# Patient Record
Sex: FEMALE | Race: WHITE | URBAN - METROPOLITAN AREA
[De-identification: names, ages, dates, MRNs, and addresses within clinical notes are randomized per-mention and may not be internally consistent; named-entity substitution may affect disease eponyms.]

---

## 2020-07-31 ENCOUNTER — OFFICE VISIT (OUTPATIENT)
Dept: NEUROLOGY | Age: 55
End: 2020-07-31
Payer: MEDICARE

## 2020-07-31 VITALS — DIASTOLIC BLOOD PRESSURE: 74 MMHG | SYSTOLIC BLOOD PRESSURE: 120 MMHG

## 2020-07-31 PROBLEM — F90.0 ATTENTION DEFICIT HYPERACTIVITY DISORDER (ADHD), PREDOMINANTLY INATTENTIVE TYPE: Status: ACTIVE | Noted: 2020-07-31

## 2020-07-31 PROBLEM — G93.1 HYPOXIC ENCEPHALOPATHY (HCC): Status: ACTIVE | Noted: 2020-07-31

## 2020-07-31 PROBLEM — G47.31 PRIMARY CENTRAL SLEEP APNEA: Status: ACTIVE | Noted: 2020-07-31

## 2020-07-31 PROBLEM — G47.419 PRIMARY NARCOLEPSY WITHOUT CATAPLEXY: Status: ACTIVE | Noted: 2020-07-31

## 2020-07-31 PROBLEM — R41.3 MEMORY LOSS: Status: ACTIVE | Noted: 2020-07-31

## 2020-07-31 PROCEDURE — 3017F COLORECTAL CA SCREEN DOC REV: CPT | Performed by: PSYCHIATRY & NEUROLOGY

## 2020-07-31 PROCEDURE — G8421 BMI NOT CALCULATED: HCPCS | Performed by: PSYCHIATRY & NEUROLOGY

## 2020-07-31 PROCEDURE — 99215 OFFICE O/P EST HI 40 MIN: CPT | Performed by: PSYCHIATRY & NEUROLOGY

## 2020-07-31 PROCEDURE — 4004F PT TOBACCO SCREEN RCVD TLK: CPT | Performed by: PSYCHIATRY & NEUROLOGY

## 2020-07-31 PROCEDURE — G8427 DOCREV CUR MEDS BY ELIG CLIN: HCPCS | Performed by: PSYCHIATRY & NEUROLOGY

## 2020-07-31 RX ORDER — GABAPENTIN 600 MG/1
600 TABLET ORAL NIGHTLY
COMMUNITY
Start: 2020-04-07 | End: 2022-02-18

## 2020-07-31 RX ORDER — GLIPIZIDE 10 MG/1
10 TABLET, FILM COATED, EXTENDED RELEASE ORAL
COMMUNITY
Start: 2020-04-06

## 2020-07-31 RX ORDER — PRAVASTATIN SODIUM 20 MG
20 TABLET ORAL NIGHTLY
COMMUNITY
Start: 2020-05-08

## 2020-07-31 RX ORDER — HYDROCHLOROTHIAZIDE 25 MG/1
25 TABLET ORAL DAILY
COMMUNITY
Start: 2020-04-06

## 2020-07-31 RX ORDER — LOSARTAN POTASSIUM 100 MG/1
100 TABLET ORAL DAILY
COMMUNITY
Start: 2020-04-06

## 2020-07-31 RX ORDER — INSULIN ASPART 100 [IU]/ML
INJECTION, SOLUTION INTRAVENOUS; SUBCUTANEOUS
COMMUNITY
Start: 2020-05-08 | End: 2022-08-19

## 2020-07-31 RX ORDER — MODAFINIL 200 MG/1
200 TABLET ORAL 2 TIMES DAILY
COMMUNITY
Start: 2019-12-13 | End: 2021-08-20 | Stop reason: SDUPTHER

## 2020-07-31 RX ORDER — INSULIN GLARGINE 100 [IU]/ML
INJECTION, SOLUTION SUBCUTANEOUS
COMMUNITY
Start: 2020-04-06 | End: 2022-08-19

## 2020-07-31 RX ORDER — LANCETS 30 GAUGE
EACH MISCELLANEOUS
COMMUNITY
Start: 2020-04-06

## 2020-07-31 RX ORDER — BLOOD SUGAR DIAGNOSTIC
STRIP MISCELLANEOUS
COMMUNITY
Start: 2020-04-06

## 2020-07-31 RX ORDER — ATENOLOL 25 MG/1
25 TABLET ORAL 2 TIMES DAILY
COMMUNITY
Start: 2020-04-06

## 2020-07-31 RX ORDER — HYDROXYCHLOROQUINE SULFATE 200 MG/1
200 TABLET, FILM COATED ORAL
COMMUNITY
Start: 2019-01-23

## 2020-07-31 ASSESSMENT — ENCOUNTER SYMPTOMS
COLOR CHANGE: 0
VOMITING: 0
NAUSEA: 0
TROUBLE SWALLOWING: 0
SHORTNESS OF BREATH: 0
PHOTOPHOBIA: 0
CHOKING: 0
BACK PAIN: 0

## 2020-07-31 NOTE — PROGRESS NOTES
on file   Substance and Sexual Activity    Alcohol use: Not on file    Drug use: Not on file    Sexual activity: Not on file   Lifestyle    Physical activity     Days per week: Not on file     Minutes per session: Not on file    Stress: Not on file   Relationships    Social connections     Talks on phone: Not on file     Gets together: Not on file     Attends Buddhist service: Not on file     Active member of club or organization: Not on file     Attends meetings of clubs or organizations: Not on file     Relationship status: Not on file    Intimate partner violence     Fear of current or ex partner: Not on file     Emotionally abused: Not on file     Physically abused: Not on file     Forced sexual activity: Not on file   Other Topics Concern    Not on file   Social History Narrative    Not on file     No family history on file. Allergies   Allergen Reactions    Interferon Jerome Anaphylaxis    Sulfamethoxazole-Trimethoprim Swelling    Tizanidine Hcl Other (See Comments)       Current Outpatient Medications   Medication Sig Dispense Refill    Glucose Blood (BL TEST STRIP PACK VI) Use as instructed - check sugar 4 times daily E11.9 insulin use      Calcium Carb-Cholecalciferol (CALCIUM CARBONATE-VITAMIN D3 PO) Take by mouth      CPAP Machine MISC Please change pressure settings to 5-20 cmH20      gabapentin (NEURONTIN) 600 MG tablet Take 600 mg by mouth nightly.  Lancets MISC USE TO TEST SUGAR FOUR TIMES DAILY E11.9      Insulin Syringe-Needle U-100 (B-D INS SYR ULTRAFINE .3CC/31G) 31G X 5/16\" 0.3 ML MISC USE WITH INSULIN PENs 4 a day.   Dx; E11.9  insulin use      atenolol (TENORMIN) 25 MG tablet Take 25 mg by mouth 2 times daily      diclofenac sodium (VOLTAREN) 1 % GEL Apply 2 g topically 4 times daily      glipiZIDE (GLUCOTROL XL) 10 MG extended release tablet Take 10 mg by mouth every morning (before breakfast)      hydroCHLOROthiazide (HYDRODIURIL) 25 MG tablet Take 25 mg by mouth daily      hydroxychloroquine (PLAQUENIL) 200 MG tablet 200 mg      insulin aspart (NOVOLOG FLEXPEN) 100 UNIT/ML injection pen Inject subcutaneously 4 units before breakfast, 8 units before lunch, 10 units before dinner PLUS SS (~30 units daily)      insulin glargine (LANTUS SOLOSTAR) 100 UNIT/ML injection pen ADMINISTER 34 UNITS UNDER THE SKIN EVERY DAY IN THE MORNING      losartan (COZAAR) 100 MG tablet Take 100 mg by mouth daily      modafinil (PROVIGIL) 200 MG tablet Take 200 mg by mouth 2 times daily.  pravastatin (PRAVACHOL) 20 MG tablet Take 20 mg by mouth nightly       No current facility-administered medications for this visit. Review of Systems   Constitutional: Negative for fever. HENT: Negative for ear pain, tinnitus and trouble swallowing. Eyes: Negative for photophobia and visual disturbance. Respiratory: Negative for choking and shortness of breath. Cardiovascular: Negative for chest pain and palpitations. Gastrointestinal: Negative for nausea and vomiting. Musculoskeletal: Negative for back pain, gait problem, joint swelling, myalgias, neck pain and neck stiffness. Skin: Negative for color change. Allergic/Immunologic: Negative for food allergies. Neurological: Negative for dizziness, tremors, seizures, syncope, facial asymmetry, speech difficulty, weakness, light-headedness, numbness and headaches. Psychiatric/Behavioral: Negative for behavioral problems, confusion, hallucinations and sleep disturbance. Objective:   /74     Physical Exam  Vitals signs reviewed. Eyes:      Pupils: Pupils are equal, round, and reactive to light. Neck:      Musculoskeletal: Normal range of motion. Cardiovascular:      Rate and Rhythm: Normal rate and regular rhythm. Heart sounds: No murmur. Pulmonary:      Effort: Pulmonary effort is normal.      Breath sounds: Normal breath sounds.    Abdominal:      General: Bowel sounds are normal.   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 For now we recommended that she continue on Provigil 100 mg twice a day in the future we will bring her back in 4 months for reevaluation of ADD as she may benefit from other stimulants. Patient's hypoxic injury occurred secondary to cardiac arrest from a medication reaction. The records were not obtainable as these are very old records. My clinical standpoint given this clinical diagnosis patient is not able to work. Nicholas Massey MD, Sienna Villagomez, American Board of Psychiatry & Neurology  Board Certified in Vascular Neurology  Board Certified in Neuromuscular Medicine  Certified in [facility]:      No orders of the defined types were placed in this encounter. No orders of the defined types were placed in this encounter. Return in about 4 months (around 11/30/2020).       Riley Fisher MD

## 2020-10-23 RX ORDER — MODAFINIL 200 MG/1
200 TABLET ORAL 2 TIMES DAILY
Qty: 60 TABLET | Refills: 1 | Status: CANCELLED | OUTPATIENT
Start: 2020-10-23 | End: 2020-11-22

## 2020-10-23 NOTE — TELEPHONE ENCOUNTER
Patients  came to the [facility] office this morning requesting a refill on her Modafinil 200mg bid. Dr. Ivy Oconnell has not filled this prescription for her since 2/22/2019 at that time he was giving 100mg bid. An [place] report was ran and it appears that the last 7 prescription refills for this medication were from 3 different providers. Called and left message for them to call the office back. Per Dr. Ivy Oconnell, we cannot fill until they are seen in the office since it has been filled from multiple other providers and this will be a red flag from the government.

## 2020-10-28 ENCOUNTER — TELEPHONE (OUTPATIENT)
Dept: NEUROLOGY | Age: 55
End: 2020-10-28

## 2020-10-28 NOTE — TELEPHONE ENCOUNTER
came into [facility] again upset this has not been refilled. I told him office tried to reach out and what was said. Gave him another business card and advised to contact main number.

## 2020-10-28 NOTE — TELEPHONE ENCOUNTER
Patients  called again, he wants to know what he needs to do to get modafinil for his wife. She was last seen 7/31/2020,he states at that time you wrote a script for modafinil. We have no record of that. It was also never filled according to [facility], I questioned the  and he said \"I'm 77years old I'm sorry if I lost tract of it\"    The issue seems to be that he is stating she is on 200mg bid but all documentation we have states she is on 100mg bid. Her next appt is 11/27/2020. Please advise.

## 2020-10-30 RX ORDER — MODAFINIL 100 MG/1
TABLET ORAL
Qty: 60 TABLET | Refills: 0 | Status: SHIPPED | OUTPATIENT
Start: 2020-10-30 | End: 2020-11-30

## 2021-08-18 PROBLEM — M32.9 SYSTEMIC LUPUS ERYTHEMATOSUS (HCC): Status: ACTIVE | Noted: 2017-04-25

## 2021-08-18 PROBLEM — M79.7 FIBROMYALGIA: Status: ACTIVE | Noted: 2017-10-25

## 2021-08-18 PROBLEM — M51.36 LUMBAR DEGENERATIVE DISC DISEASE: Status: ACTIVE | Noted: 2018-11-13

## 2021-08-18 PROBLEM — Z92.29 PERSONAL HISTORY OF OTHER DRUG THERAPY (CODE): Status: ACTIVE | Noted: 2017-10-24

## 2021-08-18 PROBLEM — I10 HTN (HYPERTENSION): Status: ACTIVE | Noted: 2018-02-07

## 2021-08-18 PROBLEM — M51.369 LUMBAR DEGENERATIVE DISC DISEASE: Status: ACTIVE | Noted: 2018-11-13

## 2021-08-18 PROBLEM — M47.817 FACET ARTHROPATHY, LUMBOSACRAL: Status: ACTIVE | Noted: 2018-11-13

## 2021-08-18 PROBLEM — Z79.899 LONG-TERM USE OF PLAQUENIL: Status: ACTIVE | Noted: 2017-03-21

## 2021-08-18 PROBLEM — R05.3 CHRONIC COUGHING: Status: ACTIVE | Noted: 2018-03-27

## 2021-08-18 PROBLEM — F09 COGNITIVE AND NEUROBEHAVIORAL DYSFUNCTION FOLLOWING BRAIN INJURY (HCC): Status: ACTIVE | Noted: 2018-11-06

## 2021-08-18 PROBLEM — S06.9XAS COGNITIVE AND NEUROBEHAVIORAL DYSFUNCTION FOLLOWING BRAIN INJURY (HCC): Status: ACTIVE | Noted: 2018-11-06

## 2021-08-18 PROBLEM — E83.52 HYPERCALCEMIA: Status: ACTIVE | Noted: 2017-10-28

## 2021-08-18 PROBLEM — M25.60 JOINT STIFFNESS OF MULTIPLE SITES: Status: ACTIVE | Noted: 2018-03-27

## 2021-08-18 PROBLEM — G31.89 COGNITIVE AND NEUROBEHAVIORAL DYSFUNCTION FOLLOWING BRAIN INJURY (HCC): Status: ACTIVE | Noted: 2018-11-06

## 2021-08-18 PROBLEM — L03.90 CELLULITIS: Status: ACTIVE | Noted: 2018-02-07

## 2021-08-20 ENCOUNTER — OFFICE VISIT (OUTPATIENT)
Dept: NEUROLOGY | Age: 56
End: 2021-08-20
Payer: MEDICARE

## 2021-08-20 VITALS — DIASTOLIC BLOOD PRESSURE: 80 MMHG | WEIGHT: 150 LBS | HEART RATE: 104 BPM | SYSTOLIC BLOOD PRESSURE: 136 MMHG

## 2021-08-20 DIAGNOSIS — G47.31 PRIMARY CENTRAL SLEEP APNEA: ICD-10-CM

## 2021-08-20 DIAGNOSIS — R26.89 BALANCE DISORDER: ICD-10-CM

## 2021-08-20 DIAGNOSIS — F09 COGNITIVE DYSFUNCTION: ICD-10-CM

## 2021-08-20 DIAGNOSIS — G47.419 PRIMARY NARCOLEPSY WITHOUT CATAPLEXY: ICD-10-CM

## 2021-08-20 DIAGNOSIS — G93.1 HYPOXIC ENCEPHALOPATHY (HCC): Primary | ICD-10-CM

## 2021-08-20 PROBLEM — F33.1 DEPRESSION, MAJOR, RECURRENT, MODERATE (HCC): Status: ACTIVE | Noted: 2021-04-21

## 2021-08-20 PROCEDURE — 3017F COLORECTAL CA SCREEN DOC REV: CPT | Performed by: PSYCHIATRY & NEUROLOGY

## 2021-08-20 PROCEDURE — 1036F TOBACCO NON-USER: CPT | Performed by: PSYCHIATRY & NEUROLOGY

## 2021-08-20 PROCEDURE — G8421 BMI NOT CALCULATED: HCPCS | Performed by: PSYCHIATRY & NEUROLOGY

## 2021-08-20 PROCEDURE — 99214 OFFICE O/P EST MOD 30 MIN: CPT | Performed by: PSYCHIATRY & NEUROLOGY

## 2021-08-20 PROCEDURE — G8427 DOCREV CUR MEDS BY ELIG CLIN: HCPCS | Performed by: PSYCHIATRY & NEUROLOGY

## 2021-08-20 RX ORDER — METHYLPHENIDATE HYDROCHLORIDE 10 MG/1
10 TABLET ORAL DAILY PRN
Qty: 30 TABLET | Refills: 0 | Status: SHIPPED | OUTPATIENT
Start: 2021-08-20 | End: 2021-09-19

## 2021-08-20 RX ORDER — MODAFINIL 200 MG/1
200 TABLET ORAL 2 TIMES DAILY
Qty: 60 TABLET | Refills: 3 | Status: SHIPPED | OUTPATIENT
Start: 2021-08-20 | End: 2021-09-19

## 2021-08-20 RX ORDER — MEMANTINE HYDROCHLORIDE 5 MG/1
5 TABLET ORAL 2 TIMES DAILY
Qty: 60 TABLET | Refills: 3 | Status: SHIPPED | OUTPATIENT
Start: 2021-08-20 | End: 2022-08-19 | Stop reason: SDUPTHER

## 2021-08-20 ASSESSMENT — ENCOUNTER SYMPTOMS
VOMITING: 0
BACK PAIN: 0
NAUSEA: 0
CHOKING: 0
PHOTOPHOBIA: 0
SHORTNESS OF BREATH: 0
COLOR CHANGE: 0
TROUBLE SWALLOWING: 0

## 2021-08-20 NOTE — PROGRESS NOTES
Subjective:      Patient ID: Angelica Haile is a 60 y.o. female who presents today for:  Chief Complaint   Patient presents with    Follow-up     Pt's  states that her memory is getting worse, and very hard to concentrait on anything, She sets things down and does not know where she puts them. He says she does not drive or cook. She says that she is tired a lot of the time, she has not been taking the modafinil because she has not been seen in a while. HPI 66-year-old right-handed female history of hypoxic encephalopathy with cognitive impairment. We see her memory issues. Does resisted organization 2000. Her MMSE score last was 24. We had therefore started on Provigil 200 milligrams twice a day and this is helping. We have not seen her for a year. She has considerable hypersomnolence likely may represent narcolepsy as she is not able to get up at times and sleep does not help. We did have done well with Provigil again we had insurance issues and this was not covered. She has been seen by sleep Well. She has some days she just cannot do anything even with the Provigil. She is having a hard time with this. It is affecting her activity of daily living. She has any headaches or falls injuries she has loss of balance which is been there for years    No past medical history on file. No past surgical history on file.   Social History     Socioeconomic History    Marital status:      Spouse name: Not on file    Number of children: Not on file    Years of education: Not on file    Highest education level: Not on file   Occupational History    Not on file   Tobacco Use    Smoking status: Never Smoker    Smokeless tobacco: Never Used   Substance and Sexual Activity    Alcohol use: Never    Drug use: Never    Sexual activity: Not on file   Other Topics Concern    Not on file   Social History Narrative    Not on file     Social Determinants of Health     Financial Resource Strain:     Difficulty of Paying Living Expenses:    Food Insecurity:     Worried About Running Out of Food in the Last Year:     [address] in the Last Year:    Transportation Needs:     Lack of Transportation (Medical):  Lack of Transportation (Non-Medical):    Physical Activity:     Days of Exercise per Week:     Minutes of Exercise per Session:    Stress:     Feeling of Stress :    Social Connections:     Frequency of Communication with Friends and Family:     Frequency of Social Gatherings with Friends and Family:     Attends Sikh Services:     Active Member of Clubs or Organizations:     Attends Club or Organization Meetings:     Marital Status:    Intimate Partner Violence:     Fear of Current or Ex-Partner:     Emotionally Abused:     Physically Abused:     Sexually Abused:      No family history on file. Allergies   Allergen Reactions    Interferon Jerome Anaphylaxis    Sulfamethoxazole-Trimethoprim Swelling    Tizanidine Hcl Other (See Comments)       Current Outpatient Medications   Medication Sig Dispense Refill    Cholecalciferol 50 MCG (2000 UT) CAPS Take 2 capsules by mouth daily      modafinil (PROVIGIL) 200 MG tablet Take 1 tablet by mouth 2 times daily for 30 days. 60 tablet 3    methylphenidate (RITALIN) 10 MG tablet Take 1 tablet by mouth daily as needed (fatigue) for up to 30 days. 30 tablet 0    memantine (NAMENDA) 5 MG tablet Take 1 tablet by mouth 2 times daily 60 tablet 3    Glucose Blood (BL TEST STRIP PACK VI) Use as instructed - check sugar 4 times daily E11.9 insulin use      Calcium Carb-Cholecalciferol (CALCIUM CARBONATE-VITAMIN D3 PO) Take by mouth       gabapentin (NEURONTIN) 600 MG tablet Take 600 mg by mouth nightly.  Lancets MISC USE TO TEST SUGAR FOUR TIMES DAILY E11.9      Insulin Syringe-Needle U-100 (B-D INS SYR ULTRAFINE .3CC/31G) 31G X 5/16\" 0.3 ML MISC USE WITH INSULIN PENs 4 a day.   Dx; E11.9  insulin use      atenolol (TENORMIN) 25 MG tablet Take 25 mg by mouth 2 times daily      diclofenac sodium (VOLTAREN) 1 % GEL Apply 2 g topically 4 times daily      glipiZIDE (GLUCOTROL XL) 10 MG extended release tablet Take 10 mg by mouth every morning (before breakfast)      hydroCHLOROthiazide (HYDRODIURIL) 25 MG tablet Take 25 mg by mouth daily      hydroxychloroquine (PLAQUENIL) 200 MG tablet 200 mg      insulin aspart (NOVOLOG FLEXPEN) 100 UNIT/ML injection pen Inject subcutaneously 4 units before breakfast, 8 units before lunch, 10 units before dinner PLUS SS (~30 units daily)      insulin glargine (LANTUS SOLOSTAR) 100 UNIT/ML injection pen ADMINISTER 34 UNITS UNDER THE SKIN EVERY DAY IN THE MORNING      losartan (COZAAR) 100 MG tablet Take 100 mg by mouth daily      pravastatin (PRAVACHOL) 20 MG tablet Take 20 mg by mouth nightly       No current facility-administered medications for this visit. Review of Systems   Constitutional: Negative for fever. HENT: Negative for ear pain, tinnitus and trouble swallowing. Eyes: Negative for photophobia and visual disturbance. Respiratory: Negative for choking and shortness of breath. Cardiovascular: Negative for chest pain and palpitations. Gastrointestinal: Negative for nausea and vomiting. Musculoskeletal: Negative for back pain, gait problem, joint swelling, myalgias, neck pain and neck stiffness. Skin: Negative for color change. Allergic/Immunologic: Negative for food allergies. Neurological: Negative for dizziness, tremors, seizures, syncope, facial asymmetry, speech difficulty, weakness, light-headedness, numbness and headaches. Psychiatric/Behavioral: Negative for behavioral problems, confusion, hallucinations and sleep disturbance. Objective:   /80 (Site: Left Upper Arm, Position: Sitting, Cuff Size: Large Adult)   Pulse 104   Wt 150 lb (68 kg)     Physical Exam  Vitals reviewed. Eyes:      Pupils: Pupils are equal, round, and reactive to light. Cardiovascular:      Rate and Rhythm: Normal rate and regular rhythm. Heart sounds: No murmur heard. Pulmonary:      Effort: Pulmonary effort is normal.      Breath sounds: Normal breath sounds. Abdominal:      General: Bowel sounds are normal.   Musculoskeletal:         General: Normal range of motion. Cervical back: Normal range of motion. Skin:     General: Skin is warm. Neurological:      Mental Status: She is alert and oriented to person, place, and time. Cranial Nerves: No cranial nerve deficit. Sensory: No sensory deficit. Motor: No abnormal muscle tone. Coordination: Coordination normal.      Deep Tendon Reflexes: Reflexes are normal and symmetric. Babinski sign absent on the right side. Babinski sign absent on the left side. Psychiatric:         Mood and Affect: Mood normal.     She has a balance issue with gait ataxia. Patient was never admitted. No results found for: WBC, RBC, HGB, HCT, MCV, MCH, MCHC, RDW, PLT, MPV  No results found for: NA, K, CL, CO2, BUN, CREATININE, GFRAA, AGRATIO, LABGLOM, GLUCOSE, PROT, LABALBU, CALCIUM, BILITOT, ALKPHOS, AST, ALT  No results found for: PROTIME, INR  No results found for: TSH, [id number], FOLATE, FERRITIN, IRON, TIBC, PTRFSAT, TSH, FREET4  No results found for: TRIG, HDL, LDLCALC, LDLDIRECT, LABVLDL  No results found for: LABAMPH, BARBSCNU, LABBENZ, CANNAB, COCAINESCRN, LABMETH, OPIATESCREENURINE, PHENCYCLIDINESCREENURINE, PPXUR, ETOH  No results found for: LITHIUM, DILFRTOT, VALPROATE    Assessment:       Diagnosis Orders   1. Hypoxic encephalopathy ([facility] [address]     2. Primary narcolepsy without cataplexy  modafinil (PROVIGIL) 200 MG tablet    methylphenidate (RITALIN) 10 MG tablet   3. Cognitive dysfunction     4. Balance disorder     5. 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   We had seen her a few years ago and then followed her and started on Provigil which is helped she truly does have underlying primary narcolepsy from the evaluations noted. Patient does respond very well to Provigil and when she does not have though she cannot function or even get out of bed. Still some days she still does not respond to Provigil and stays in bed. Recommend that we add very low-dose of Ritalin as and when required only for the bad days to see if this will take her through the day for activity of daily living. Patient is having more memory issues and I recommend he start on Namenda as there is no other option for memory medications to see if this help. Plan:      No orders of the defined types were placed in this encounter. Orders Placed This Encounter   Medications    modafinil (PROVIGIL) 200 MG tablet     Sig: Take 1 tablet by mouth 2 times daily for 30 days. Dispense:  60 tablet     Refill:  3    methylphenidate (RITALIN) 10 MG tablet     Sig: Take 1 tablet by mouth daily as needed (fatigue) for up to 30 days. Dispense:  30 tablet     Refill:  0    memantine (NAMENDA) 5 MG tablet     Sig: Take 1 tablet by mouth 2 times daily     Dispense:  60 tablet     Refill:  3       No follow-ups on file.       Blaise Salinas MD

## 2022-02-18 ENCOUNTER — OFFICE VISIT (OUTPATIENT)
Dept: NEUROLOGY | Age: 57
End: 2022-02-18
Payer: MEDICARE

## 2022-02-18 VITALS — SYSTOLIC BLOOD PRESSURE: 122 MMHG | WEIGHT: 146 LBS | DIASTOLIC BLOOD PRESSURE: 76 MMHG | HEART RATE: 99 BPM

## 2022-02-18 DIAGNOSIS — H40.033 ANATOMICAL NARROW ANGLE, BILATERAL: ICD-10-CM

## 2022-02-18 DIAGNOSIS — F09 COGNITIVE DYSFUNCTION: ICD-10-CM

## 2022-02-18 DIAGNOSIS — G93.1 HYPOXIC ENCEPHALOPATHY (HCC): Primary | ICD-10-CM

## 2022-02-18 DIAGNOSIS — S06.9XAS COGNITIVE AND NEUROBEHAVIORAL DYSFUNCTION FOLLOWING BRAIN INJURY (HCC): ICD-10-CM

## 2022-02-18 DIAGNOSIS — G31.89 COGNITIVE AND NEUROBEHAVIORAL DYSFUNCTION FOLLOWING BRAIN INJURY (HCC): ICD-10-CM

## 2022-02-18 DIAGNOSIS — M51.36 LUMBAR DEGENERATIVE DISC DISEASE: ICD-10-CM

## 2022-02-18 DIAGNOSIS — F09 COGNITIVE AND NEUROBEHAVIORAL DYSFUNCTION FOLLOWING BRAIN INJURY (HCC): ICD-10-CM

## 2022-02-18 DIAGNOSIS — R41.3 MEMORY LOSS: ICD-10-CM

## 2022-02-18 PROCEDURE — G8421 BMI NOT CALCULATED: HCPCS | Performed by: PSYCHIATRY & NEUROLOGY

## 2022-02-18 PROCEDURE — G8484 FLU IMMUNIZE NO ADMIN: HCPCS | Performed by: PSYCHIATRY & NEUROLOGY

## 2022-02-18 PROCEDURE — 1036F TOBACCO NON-USER: CPT | Performed by: PSYCHIATRY & NEUROLOGY

## 2022-02-18 PROCEDURE — G8427 DOCREV CUR MEDS BY ELIG CLIN: HCPCS | Performed by: PSYCHIATRY & NEUROLOGY

## 2022-02-18 PROCEDURE — 99214 OFFICE O/P EST MOD 30 MIN: CPT | Performed by: PSYCHIATRY & NEUROLOGY

## 2022-02-18 PROCEDURE — 3017F COLORECTAL CA SCREEN DOC REV: CPT | Performed by: PSYCHIATRY & NEUROLOGY

## 2022-02-18 RX ORDER — METHYLPHENIDATE HYDROCHLORIDE 10 MG/1
10 TABLET ORAL DAILY PRN
COMMUNITY
Start: 2021-08-20 | End: 2022-08-19 | Stop reason: SDUPTHER

## 2022-02-18 RX ORDER — GABAPENTIN 300 MG/1
300 CAPSULE ORAL 3 TIMES DAILY
COMMUNITY
Start: 2022-02-02 | End: 2022-08-19 | Stop reason: SDUPTHER

## 2022-02-18 RX ORDER — MODAFINIL 200 MG/1
200 TABLET ORAL 2 TIMES DAILY
Qty: 60 TABLET | Refills: 0 | Status: SHIPPED | OUTPATIENT
Start: 2022-02-18 | End: 2022-03-20

## 2022-02-18 RX ORDER — MODAFINIL 200 MG/1
200 TABLET ORAL 2 TIMES DAILY
COMMUNITY
End: 2022-02-18 | Stop reason: SDUPTHER

## 2022-02-18 ASSESSMENT — ENCOUNTER SYMPTOMS
COLOR CHANGE: 0
SHORTNESS OF BREATH: 0
CHOKING: 0
VOMITING: 0
TROUBLE SWALLOWING: 0
NAUSEA: 0
PHOTOPHOBIA: 0
BACK PAIN: 1

## 2022-02-18 NOTE — PROGRESS NOTES
Subjective:      Patient ID: Brigitte Agarwal is a 60 y.o. female who presents today for:  Chief Complaint   Patient presents with    Follow-up     Pt states that she has an injury and has been having some difficulty. She says that she is venkat gto be having an emg at the [address] [place] [address], and says she already had a mri. She says the injury is that she fell on her back and afterwards she wasnt able to walk for a while, and is also doing PT right now, she says they believe that it is something to do with her nerves. HPI 78-year-old right-handed female with a history of hypoxic ischemic encephalopathy with cognitive dysfunction. Patient had a cardiorespiratory arrest in the year 2000. And then was followed up here for a few years intermittently and her Mini-Mental examination score is 24 out of 30. There appears to be signs of narcolepsy and we have continued her on Provigil which is helping. She has difficult attention. We had reinitiated her Provigil R milligrams twice a day and to see if there is any other additional issues that may be causing her cognitive decline. Since he has had some major issues that she fell in December while raking the leaves and injured her back. She was [facility] at [facility]. Some of the results were reviewed and MRI had not shown anything Sequent for consult pain in the right leg. It is unclear if she has amyotrophy and she has an EMG pending. She was on tramadol for a while and Flexeril and when she discontinued tramadol as per instruction she had some withdrawal symptoms she still having those. She is just started taking her Provigil. We have given her a very short course of Ritalin as well which she did not think helped and Namenda is not helping. No past medical history on file. No past surgical history on file.   Social History     Socioeconomic History    Marital status:      Spouse name: Not on file    Number of children: Not on file    Years of education: Not on file    Highest education level: Not on file   Occupational History    Not on file   Tobacco Use    Smoking status: Never Smoker    Smokeless tobacco: Never Used   Substance and Sexual Activity    Alcohol use: Never    Drug use: Never    Sexual activity: Not on file   Other Topics Concern    Not on file   Social History Narrative    Not on file     Social Determinants of Health     Financial Resource Strain:     Difficulty of Paying Living Expenses: Not on file   Food Insecurity:     Worried About Running Out of Food in the Last Year: Not on file    Kari of Food in the Last Year: Not on file   Transportation Needs:     Lack of Transportation (Medical): Not on file    Lack of Transportation (Non-Medical): Not on file   Physical Activity:     Days of Exercise per Week: Not on file    Minutes of Exercise per Session: Not on file   Stress:     Feeling of Stress : Not on file   Social Connections:     Frequency of Communication with Friends and Family: Not on file    Frequency of Social Gatherings with Friends and Family: Not on file    Attends Tenriism Services: Not on file    Active Member of [address] [place] [address] or Organizations: Not on file    Attends Club or Organization Meetings: Not on file    Marital Status: Not on file   Intimate Partner Violence:     Fear of Current or Ex-Partner: Not on file    Emotionally Abused: Not on file    Physically Abused: Not on file    Sexually Abused: Not on file   Housing Stability:     Unable to Pay for Housing in the Last Year: Not on file    Number of [place] in the Last Year: Not on file    Unstable Housing in the Last Year: Not on file     No family history on file.   Allergies   Allergen Reactions    Interferon Jerome Anaphylaxis    Sulfamethoxazole-Trimethoprim Swelling    Tizanidine Hcl Other (See Comments)       Current Outpatient Medications   Medication Sig Dispense Refill    gabapentin (NEURONTIN) 300 MG capsule TAKE ONE CAPSULE BY MOUTH for chest pain and palpitations. Gastrointestinal: Negative for nausea and vomiting. Musculoskeletal: Positive for back pain and gait problem. Negative for joint swelling, myalgias, neck pain and neck stiffness. Skin: Negative for color change. Allergic/Immunologic: Negative for food allergies. Neurological: Negative for dizziness, tremors, seizures, syncope, facial asymmetry, speech difficulty, weakness, light-headedness, numbness and headaches. Psychiatric/Behavioral: Negative for behavioral problems, confusion, hallucinations and sleep disturbance. Objective:   /76 (Site: Left Upper Arm, Position: Sitting, Cuff Size: Medium Adult)   Pulse 99   Wt 146 lb (66.2 kg)     Physical Exam  Vitals reviewed. Eyes:      Pupils: Pupils are equal, round, and reactive to light. Cardiovascular:      Rate and Rhythm: Normal rate and regular rhythm. Heart sounds: No murmur heard. Pulmonary:      Effort: Pulmonary effort is normal.      Breath sounds: Normal breath sounds. Abdominal:      General: Bowel sounds are normal.   Musculoskeletal:         General: Normal range of motion. Cervical back: Normal range of motion. Skin:     General: Skin is warm. Neurological:      Mental Status: She is alert and oriented to person, place, and time. Cranial Nerves: No cranial nerve deficit. Sensory: No sensory deficit. Motor: No abnormal muscle tone. Coordination: Coordination normal.      Deep Tendon Reflexes: Reflexes are normal and symmetric. Babinski sign absent on the right side. Babinski sign absent on the left side. Psychiatric:         Mood and Affect: Mood normal.     Examination is normal except for absent ankle reflexes bilaterally. Patient was never admitted.     No results found for: WBC, RBC, HGB, HCT, MCV, MCH, MCHC, RDW, PLT, MPV  No results found for: NA, K, CL, CO2, BUN, CREATININE, GFRAA, AGRATIO, LABGLOM, GLUCOSE, PROT, LABALBU, CALCIUM, BILITOT, ALKPHOS, AST, ALT  No results found for: PROTIME, INR  No results found for: TSH, [id number], FOLATE, FERRITIN, IRON, TIBC, PTRFSAT, TSH, FREET4  No results found for: TRIG, HDL, LDLCALC, LDLDIRECT, LABVLDL  No results found for: LABAMPH, BARBSCNU, LABBENZ, CANNAB, COCAINESCRN, LABMETH, OPIATESCREENURINE, PHENCYCLIDINESCREENURINE, PPXUR, ETOH  No results found for: LITHIUM, DILFRTOT, VALPROATE    Assessment:       Diagnosis Orders   1. Hypoxic encephalopathy ([facility] [address]     2. Cognitive dysfunction     3. Cognitive and neurobehavioral dysfunction following brain injury ([facility] [address]     4. Memory loss     5. Lumbar degenerative disc disease     6. 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  The patient was seen by us intermittently for few years and have continued on Provigil which is helping. In the interim she fell and injured her back and she is under care of pain management at [facility]. She was on tramadol and had some tramadol withdrawal she thinks. She did not do well on addition of Ritalin. She does not think memantine is helping. Her memory continues to slowly decline though she is still independent and I do not see for change in this. In any case her main issues appears to be back pain. 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 EMG is pending. For now she will follow up with [facility] and will assess her results when available. Her MRI was done at given clinical was not able to see the films but did not appear to show any significant findings at least for lumbar radiculopathy. Will reevaluate results of the same when available    Nicholas Ng MD, Carissa Pradhan, American Board of Psychiatry & Neurology  Board Certified in Vascular Neurology  Board Certified in Neuromuscular Medicine  Certified in [facility]:      No orders of the defined types were placed in this encounter.     No orders of the defined types were placed in this encounter. No follow-ups on file.       Lesley Kaufman MD

## 2022-08-09 PROBLEM — M54.9 BACKACHE: Status: ACTIVE | Noted: 2021-12-15

## 2022-08-09 PROBLEM — R29.898 WEAKNESS OF BOTH LOWER EXTREMITIES: Status: ACTIVE | Noted: 2022-01-19

## 2022-08-09 PROBLEM — M54.17 RIGHT LUMBOSACRAL RADICULOPATHY: Status: ACTIVE | Noted: 2022-01-27

## 2022-08-11 PROBLEM — M51.36 LUMBAR DEGENERATIVE DISC DISEASE: Status: ACTIVE | Noted: 2022-08-11

## 2022-08-11 PROBLEM — M51.369 LUMBAR DEGENERATIVE DISC DISEASE: Status: ACTIVE | Noted: 2022-08-11

## 2022-08-19 ENCOUNTER — OFFICE VISIT (OUTPATIENT)
Dept: NEUROLOGY | Age: 57
End: 2022-08-19
Payer: MEDICARE

## 2022-08-19 VITALS
HEART RATE: 110 BPM | HEIGHT: 62 IN | SYSTOLIC BLOOD PRESSURE: 128 MMHG | DIASTOLIC BLOOD PRESSURE: 82 MMHG | BODY MASS INDEX: 30 KG/M2 | WEIGHT: 163 LBS

## 2022-08-19 DIAGNOSIS — M79.7 FIBROMYALGIA: ICD-10-CM

## 2022-08-19 DIAGNOSIS — R20.2 PARESTHESIAS: ICD-10-CM

## 2022-08-19 DIAGNOSIS — F09 COGNITIVE DYSFUNCTION: ICD-10-CM

## 2022-08-19 DIAGNOSIS — G93.1 HYPOXIC ENCEPHALOPATHY (HCC): Primary | ICD-10-CM

## 2022-08-19 DIAGNOSIS — R29.898 WEAKNESS OF BOTH LOWER EXTREMITIES: ICD-10-CM

## 2022-08-19 PROBLEM — M35.01 KERATOCONJUNCTIVITIS SICCA, IN SJOGREN'S SYNDROME (HCC): Status: ACTIVE | Noted: 2022-07-08

## 2022-08-19 PROBLEM — R79.89 ELEVATED LFTS: Status: ACTIVE | Noted: 2022-07-08

## 2022-08-19 PROBLEM — R26.9 ABNORMALITY OF GAIT: Status: ACTIVE | Noted: 2022-03-09

## 2022-08-19 PROCEDURE — 3017F COLORECTAL CA SCREEN DOC REV: CPT | Performed by: PSYCHIATRY & NEUROLOGY

## 2022-08-19 PROCEDURE — 1036F TOBACCO NON-USER: CPT | Performed by: PSYCHIATRY & NEUROLOGY

## 2022-08-19 PROCEDURE — G8427 DOCREV CUR MEDS BY ELIG CLIN: HCPCS | Performed by: PSYCHIATRY & NEUROLOGY

## 2022-08-19 PROCEDURE — G8419 CALC BMI OUT NRM PARAM NOF/U: HCPCS | Performed by: PSYCHIATRY & NEUROLOGY

## 2022-08-19 PROCEDURE — 99214 OFFICE O/P EST MOD 30 MIN: CPT | Performed by: PSYCHIATRY & NEUROLOGY

## 2022-08-19 RX ORDER — GABAPENTIN 300 MG/1
300 CAPSULE ORAL 3 TIMES DAILY
Qty: 90 CAPSULE | Refills: 0 | Status: SHIPPED | OUTPATIENT
Start: 2022-08-19 | End: 2022-09-19

## 2022-08-19 RX ORDER — METHYLPHENIDATE HYDROCHLORIDE 10 MG/1
10 TABLET ORAL DAILY PRN
Qty: 30 TABLET | Refills: 0 | Status: SHIPPED | OUTPATIENT
Start: 2022-08-19 | End: 2022-09-18

## 2022-08-19 RX ORDER — MODAFINIL 200 MG/1
200 TABLET ORAL DAILY
COMMUNITY
End: 2022-08-19 | Stop reason: SDUPTHER

## 2022-08-19 RX ORDER — CYCLOBENZAPRINE HCL 10 MG
TABLET ORAL
COMMUNITY
Start: 2022-07-25

## 2022-08-19 RX ORDER — TRAMADOL HYDROCHLORIDE 50 MG/1
TABLET ORAL
COMMUNITY
Start: 2022-07-25

## 2022-08-19 RX ORDER — MODAFINIL 200 MG/1
200 TABLET ORAL DAILY
Qty: 30 TABLET | Refills: 0 | Status: SHIPPED | OUTPATIENT
Start: 2022-08-19 | End: 2022-09-18

## 2022-08-19 RX ORDER — DULOXETIN HYDROCHLORIDE 30 MG/1
60 CAPSULE, DELAYED RELEASE ORAL DAILY
COMMUNITY
Start: 2022-07-25

## 2022-08-19 RX ORDER — INSULIN GLARGINE 100 [IU]/ML
INJECTION, SOLUTION SUBCUTANEOUS NIGHTLY
COMMUNITY

## 2022-08-19 RX ORDER — PEN NEEDLE, DIABETIC 31 GX5/16"
NEEDLE, DISPOSABLE MISCELLANEOUS
COMMUNITY
Start: 2022-06-09

## 2022-08-19 RX ORDER — MEMANTINE HYDROCHLORIDE 5 MG/1
5 TABLET ORAL 2 TIMES DAILY
Qty: 60 TABLET | Refills: 3 | Status: SHIPPED | OUTPATIENT
Start: 2022-08-19

## 2022-08-19 ASSESSMENT — ENCOUNTER SYMPTOMS
COLOR CHANGE: 0
VOMITING: 0
TROUBLE SWALLOWING: 0
BACK PAIN: 1
CHOKING: 0
SHORTNESS OF BREATH: 0
NAUSEA: 0
PHOTOPHOBIA: 0

## 2022-08-19 NOTE — PROGRESS NOTES
Subjective:      Patient ID: Fifi Felix is a 59 y.o. female who presents today for:  Chief Complaint   Patient presents with    6 Month Follow-Up     Hypoxic encephalopathy , patient is doing well, no concerns at this time. Pt states she did have some blood work and testing done at [address] [place] [address] for small fiber neuropathy        HPI 38-year-old male who we see for hypoxic ischemic encephalopathy. Since last seen patient was seen at [facility] clinic by neurology and diagnosed with a small fiber neuropathy with a biopsy. We are not quite sure why the biopsy was done as we are quite aware that this patient has uncontrolled diabetes likely to cause a neuropathy though this was done she does have small fiber neuropathy  Gabapentin was increase is helping to some degree she is also on Cymbalta. Patient has some symptoms in her hands as well. She is now better controlled in her diabetes as initially she had a hemoglobin A1c of 13. Patient had back pain and was injected and feels better    History reviewed. No pertinent past medical history. History reviewed. No pertinent surgical history. Social History     Socioeconomic History    Marital status:      Spouse name: Not on file    Number of children: Not on file    Years of education: Not on file    Highest education level: Not on file   Occupational History    Not on file   Tobacco Use    Smoking status: Never    Smokeless tobacco: Never   Substance and Sexual Activity    Alcohol use: Never    Drug use: Never    Sexual activity: Not on file   Other Topics Concern    Not on file   Social History Narrative    Not on file     Social Determinants of Health     Financial Resource Strain: Not on file   Food Insecurity: Not on file   Transportation Needs: Not on file   Physical Activity: Not on file   Stress: Not on file   Social Connections: Not on file   Intimate Partner Violence: Not on file   Housing Stability: Not on file     History reviewed.  No pertinent family history. Allergies   Allergen Reactions    Interferon Jerome Anaphylaxis    Sulfamethoxazole-Trimethoprim Swelling    Tizanidine Hcl Other (See Comments)       Current Outpatient Medications   Medication Sig Dispense Refill    traMADol (ULTRAM) 50 MG tablet TAKE 1 TABLET BY MOUTH TWICE DAILY AS NEEDED FOR PAIN      B-D UF III MINI PEN NEEDLES 31G X 5 MM MISC USE WITH INSULIN PENS FOUR TIMES DAILY      DULoxetine (CYMBALTA) 30 MG extended release capsule Take 60 mg by mouth daily      cyclobenzaprine (FLEXERIL) 10 MG tablet TAKE 1 TABLET BY MOUTH TWICE DAILY AS NEEDED FOR MUSCLE SPASM      insulin lispro (HUMALOG) 100 UNIT/ML injection vial Inject into the skin 3 times daily (before meals)      insulin glargine (BASAGLAR KWIKPEN) 100 UNIT/ML injection pen Inject into the skin nightly      modafinil (PROVIGIL) 200 MG tablet Take 200 mg by mouth daily. gabapentin (NEURONTIN) 300 MG capsule Take 300 mg by mouth in the morning, at noon, and at bedtime. methylphenidate (RITALIN) 10 MG tablet Take 10 mg by mouth daily as needed. Cholecalciferol 50 MCG (2000 UT) CAPS Take 2 capsules by mouth daily      memantine (NAMENDA) 5 MG tablet Take 1 tablet by mouth 2 times daily 60 tablet 3    Glucose Blood (BL TEST STRIP PACK VI) Use as instructed - check sugar 4 times daily E11.9 insulin use      Calcium Carb-Cholecalciferol (CALCIUM CARBONATE-VITAMIN D3 PO) Take by mouth       Lancets MISC USE TO TEST SUGAR FOUR TIMES DAILY E11.9      Insulin Syringe-Needle U-100 31G X 5/16\" 0.3 ML MISC USE WITH INSULIN PENs 4 a day.   Dx; E11.9  insulin use      atenolol (TENORMIN) 25 MG tablet Take 25 mg by mouth 2 times daily      diclofenac sodium (VOLTAREN) 1 % GEL Apply 2 g topically 4 times daily      glipiZIDE (GLUCOTROL XL) 10 MG extended release tablet Take 10 mg by mouth every morning (before breakfast)      hydroCHLOROthiazide (HYDRODIURIL) 25 MG tablet Take 25 mg by mouth daily      hydroxychloroquine (PLAQUENIL) 200 MG tablet 200 mg      losartan (COZAAR) 100 MG tablet Take 100 mg by mouth daily      pravastatin (PRAVACHOL) 20 MG tablet Take 20 mg by mouth nightly       No current facility-administered medications for this visit. Review of Systems   Constitutional:  Negative for fever. HENT:  Negative for ear pain, tinnitus and trouble swallowing. Eyes:  Negative for photophobia and visual disturbance. Respiratory:  Negative for choking and shortness of breath. Cardiovascular:  Negative for chest pain and palpitations. Gastrointestinal:  Negative for nausea and vomiting. Musculoskeletal:  Positive for back pain, gait problem and myalgias. Negative for joint swelling, neck pain and neck stiffness. Skin:  Negative for color change. Allergic/Immunologic: Negative for food allergies. Neurological:  Positive for numbness. Negative for dizziness, tremors, seizures, syncope, facial asymmetry, speech difficulty, weakness, light-headedness and headaches. Psychiatric/Behavioral:  Negative for behavioral problems, confusion, hallucinations and sleep disturbance. Objective:   /82 (Site: Left Upper Arm, Position: Sitting, Cuff Size: Medium Adult)   Pulse (!) 110   Ht 5' 2\" (1.575 m)   Wt 163 lb (73.9 kg)   BMI 29.81 kg/m²     Physical Exam  Vitals reviewed. Eyes:      Pupils: Pupils are equal, round, and reactive to light. Cardiovascular:      Rate and Rhythm: Normal rate and regular rhythm. Heart sounds: No murmur heard. Pulmonary:      Effort: Pulmonary effort is normal.      Breath sounds: Normal breath sounds. Abdominal:      General: Bowel sounds are normal.   Musculoskeletal:         General: Normal range of motion. Cervical back: Normal range of motion. Skin:     General: Skin is warm. Neurological:      Mental Status: She is alert and oriented to person, place, and time. Cranial Nerves: No cranial nerve deficit. Sensory: No sensory deficit.       Motor: No abnormal muscle tone. Coordination: Coordination normal.      Deep Tendon Reflexes: Reflexes are normal and symmetric. Babinski sign absent on the right side. Babinski sign absent on the left side. Comments: Patient has mild ataxia with lower extremity weakness and she is areflexic in the lower extremities. Psychiatric:         Mood and Affect: Mood normal.   She is areflexic in the lower extremity with gait ataxia. Patient was never admitted. No results found for: WBC, RBC, HGB, HCT, MCV, MCH, MCHC, RDW, PLT, MPV  No results found for: NA, K, CL, CO2, BUN, CREATININE, GFRAA, AGRATIO, LABGLOM, GLUCOSE, PROT, LABALBU, CALCIUM, BILITOT, ALKPHOS, AST, ALT  No results found for: PROTIME, INR  No results found for: TSH, PDOYDQGE12, FOLATE, FERRITIN, IRON, TIBC, PTRFSAT, RETICCOUNT, TSH, FREET4  No results found for: TRIG, HDL, LDLCALC, LDLDIRECT, LABVLDL  No results found for: LABAMPH, BARBSCNU, LABBENZ, CANNAB, COCAINESCRN, LABMETH, OPIATESCREENURINE, PHENCYCLIDINESCREENURINE, PPXUR, ETOH  No results found for: LITHIUM, DILFRTOT, VALPROATE    Assessment:       Diagnosis Orders   1. Hypoxic encephalopathy ([facility]        2. Weakness of both lower extremities        3. Cognitive dysfunction        4. Paresthesias        5. 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  We have followed her for cognitive changes and she continues on Namenda 5 mg twice a day\" quite functional.  Since last seen patient was seen at [facility] clinic had a nerve biopsy for small fiber neuropathy. We are aware even without the biopsy that  she does have small fiber neuropathy as her hemoglobin A1c was 13. She underwent a biopsy for the same and she does have small fiber neuropathy. She had some questions regarding treatments there are no treatments for such neuropathies except for symptomatic treatment she is on gabapentin which she takes 600 mg 3 times a day.   She is helping her to some degree. Patient is still functional for some gait issues and minor memory issues. He has back issues which is improved with an injection. Currently I do not appreciate any other changes and we will continue to follow her though I did discuss with her that she is seeing 2 neurologists which may not be to her advantage. She  may want to consider choosing 1 at a time  Patient also continues on methylphenidate and Provigil for cognitive abilities which has helped her. With this medication she has not declined. OARRS reports are checked. Nicholas Valentin MD, Jessica Armstrong, American Board of Psychiatry & Neurology  Board Certified in Vascular Neurology  Board Certified in Neuromuscular Medicine  Certified in [facility]:      No orders of the defined types were placed in this encounter. No orders of the defined types were placed in this encounter. No follow-ups on file.       Pedro Bhatti MD

## 2022-09-19 RX ORDER — GABAPENTIN 300 MG/1
300 CAPSULE ORAL 3 TIMES DAILY
Qty: 90 CAPSULE | Refills: 1 | Status: SHIPPED | OUTPATIENT
Start: 2022-09-19 | End: 2022-10-19

## 2022-09-19 NOTE — TELEPHONE ENCOUNTER
Pharmacy is requesting medication refill.  Please approve or deny this request.    Rx requested:  Requested Prescriptions     Pending Prescriptions Disp Refills    gabapentin (NEURONTIN) 300 MG capsule [Pharmacy Med Name: GABAPENTIN 300MG CAPSULES] 90 capsule 0     Sig: TAKE 1 CAPSULE BY MOUTH IN THE MORNING, AT NOON, AND AT BEDTIME FOR 30 DAYS         Last Office Visit:   8/19/2022      Next Visit Date:  Future Appointments   Date Time Provider Klaus Davila   2/17/2023  9:00 AM Shena Macedo MD [facility]

## 2023-02-17 ENCOUNTER — OFFICE VISIT (OUTPATIENT)
Dept: NEUROLOGY | Age: 58
End: 2023-02-17
Payer: MEDICARE

## 2023-02-17 VITALS
SYSTOLIC BLOOD PRESSURE: 118 MMHG | DIASTOLIC BLOOD PRESSURE: 74 MMHG | BODY MASS INDEX: 31.39 KG/M2 | HEART RATE: 102 BPM | WEIGHT: 171.6 LBS

## 2023-02-17 DIAGNOSIS — R20.2 PARESTHESIAS: ICD-10-CM

## 2023-02-17 DIAGNOSIS — G62.9 SMALL FIBER NEUROPATHY: ICD-10-CM

## 2023-02-17 DIAGNOSIS — F09 COGNITIVE DYSFUNCTION: ICD-10-CM

## 2023-02-17 DIAGNOSIS — R29.898 WEAKNESS OF BOTH LOWER EXTREMITIES: ICD-10-CM

## 2023-02-17 DIAGNOSIS — R27.0 ATAXIA: ICD-10-CM

## 2023-02-17 DIAGNOSIS — R41.3 MEMORY LOSS: ICD-10-CM

## 2023-02-17 DIAGNOSIS — G93.1 HYPOXIC ENCEPHALOPATHY (HCC): Primary | ICD-10-CM

## 2023-02-17 PROCEDURE — G8417 CALC BMI ABV UP PARAM F/U: HCPCS | Performed by: PSYCHIATRY & NEUROLOGY

## 2023-02-17 PROCEDURE — 99214 OFFICE O/P EST MOD 30 MIN: CPT | Performed by: PSYCHIATRY & NEUROLOGY

## 2023-02-17 PROCEDURE — 3074F SYST BP LT 130 MM HG: CPT | Performed by: PSYCHIATRY & NEUROLOGY

## 2023-02-17 PROCEDURE — 1036F TOBACCO NON-USER: CPT | Performed by: PSYCHIATRY & NEUROLOGY

## 2023-02-17 PROCEDURE — 3017F COLORECTAL CA SCREEN DOC REV: CPT | Performed by: PSYCHIATRY & NEUROLOGY

## 2023-02-17 PROCEDURE — 3078F DIAST BP <80 MM HG: CPT | Performed by: PSYCHIATRY & NEUROLOGY

## 2023-02-17 PROCEDURE — G8427 DOCREV CUR MEDS BY ELIG CLIN: HCPCS | Performed by: PSYCHIATRY & NEUROLOGY

## 2023-02-17 PROCEDURE — G8484 FLU IMMUNIZE NO ADMIN: HCPCS | Performed by: PSYCHIATRY & NEUROLOGY

## 2023-02-17 RX ORDER — MEMANTINE HYDROCHLORIDE 5 MG/1
5 TABLET ORAL 2 TIMES DAILY
Qty: 60 TABLET | Refills: 3 | Status: SHIPPED | OUTPATIENT
Start: 2023-02-17

## 2023-02-17 RX ORDER — ERGOCALCIFEROL 1.25 MG/1
CAPSULE ORAL
COMMUNITY
Start: 2023-01-12

## 2023-02-17 RX ORDER — PREGABALIN 100 MG/1
CAPSULE ORAL
COMMUNITY
Start: 2022-12-19

## 2023-02-17 RX ORDER — DULOXETIN HYDROCHLORIDE 60 MG/1
CAPSULE, DELAYED RELEASE ORAL
COMMUNITY
Start: 2022-12-21

## 2023-02-17 RX ORDER — METHYLPHENIDATE HYDROCHLORIDE 10 MG/1
10 TABLET ORAL PRN
COMMUNITY

## 2023-02-17 RX ORDER — MODAFINIL 200 MG/1
200 TABLET ORAL 2 TIMES DAILY
COMMUNITY

## 2023-02-17 RX ORDER — METHYLPHENIDATE HYDROCHLORIDE 10 MG/1
10 TABLET ORAL DAILY PRN
Qty: 30 TABLET | Refills: 0 | Status: SHIPPED | OUTPATIENT
Start: 2023-02-17 | End: 2023-03-19

## 2023-02-17 RX ORDER — MODAFINIL 200 MG/1
200 TABLET ORAL 2 TIMES DAILY
Qty: 60 TABLET | Refills: 0 | Status: SHIPPED | OUTPATIENT
Start: 2023-02-17 | End: 2023-03-19

## 2023-02-17 ASSESSMENT — ENCOUNTER SYMPTOMS
SHORTNESS OF BREATH: 0
VOMITING: 0
TROUBLE SWALLOWING: 0
CHOKING: 0
COLOR CHANGE: 0
NAUSEA: 0
BACK PAIN: 0
PHOTOPHOBIA: 0

## 2023-02-17 NOTE — PROGRESS NOTES
Subjective:      Patient ID: Pop Reddy is a 59 y.o. female who presents today for:  Chief Complaint   Patient presents with    6 Month Follow-Up     Pt states that she was having a bad problem with never pain. She states that she was switch to lyrica by another provider. She states that she has been going to the [facility] to go swimming and that is helping with some of the nerve pain and her rang of motion. She states that she is still having problems with her memory. Her  states that they will tell her about something that she will need to remember a few days downs away and she will not remember. HPI 42-year-old right-handed female with a history of anoxic ischemic encephalopathy secondary to cardiac arrest.  Patient has cognitive changes is on Namenda twice a day but still functional.  Patient also has of small fiber neuropathy and when last seen there appears to be report that her hemoglobin A1c was 13. She is no longer on gabapentin but on Lyrica. Is still having significant issues with nerve pain. She is going to [facility] and swimming and is helping her range of motion. Still has memory issues which are likely to stay and this is permanent. Been out of her Provigil 2 weeks ago and she can notice a difference that she is not as awake. She has still considerable issues with short-term memory. She is trying very hard to do what ever she can and actually doing quite well in terms of her functions. Using a cane or a walker to walk and has not any falls. No past medical history on file. No past surgical history on file.   Social History     Socioeconomic History    Marital status:      Spouse name: Not on file    Number of children: Not on file    Years of education: Not on file    Highest education level: Not on file   Occupational History    Not on file   Tobacco Use    Smoking status: Never    Smokeless tobacco: Never   Substance and Sexual Activity    Alcohol use: Never    Drug use: Never    Sexual activity: Not on file   Other Topics Concern    Not on file   Social History Narrative    Not on file     Social Determinants of Health     Financial Resource Strain: Not on file   Food Insecurity: Not on file   Transportation Needs: Not on file   Physical Activity: Not on file   Stress: Not on file   Social Connections: Not on file   Intimate Partner Violence: Not on file   Housing Stability: Not on file     No family history on file. Allergies   Allergen Reactions    Interferon Jerome Anaphylaxis    Sulfamethoxazole-Trimethoprim Swelling    Tizanidine Hcl Other (See Comments)       Current Outpatient Medications   Medication Sig Dispense Refill    methylphenidate (RITALIN) 10 MG tablet Take 10 mg by mouth as needed. traMADol (ULTRAM) 50 MG tablet TAKE 1 TABLET BY MOUTH TWICE DAILY AS NEEDED FOR PAIN      B-D UF III MINI PEN NEEDLES 31G X 5 MM MISC USE WITH INSULIN PENS FOUR TIMES DAILY      DULoxetine (CYMBALTA) 30 MG extended release capsule Take 60 mg by mouth daily      cyclobenzaprine (FLEXERIL) 10 MG tablet TAKE 1 TABLET BY MOUTH TWICE DAILY AS NEEDED FOR MUSCLE SPASM      insulin lispro (HUMALOG) 100 UNIT/ML injection vial Inject into the skin 3 times daily (before meals)      insulin glargine (BASAGLAR KWIKPEN) 100 UNIT/ML injection pen Inject into the skin nightly      memantine (NAMENDA) 5 MG tablet Take 1 tablet by mouth 2 times daily 60 tablet 3    Cholecalciferol 50 MCG (2000 UT) CAPS Take 2 capsules by mouth daily      Glucose Blood (BL TEST STRIP PACK VI) Use as instructed - check sugar 4 times daily E11.9 insulin use      Lancets MISC USE TO TEST SUGAR FOUR TIMES DAILY E11.9      Insulin Syringe-Needle U-100 31G X 5/16\" 0.3 ML MISC USE WITH INSULIN PENs 4 a day.   Dx; E11.9  insulin use      atenolol (TENORMIN) 25 MG tablet Take 25 mg by mouth 2 times daily      diclofenac sodium (VOLTAREN) 1 % GEL Apply 2 g topically 4 times daily      glipiZIDE (GLUCOTROL XL) 10 MG extended release tablet Take 10 mg by mouth every morning (before breakfast)      hydroCHLOROthiazide (HYDRODIURIL) 25 MG tablet Take 25 mg by mouth daily      hydroxychloroquine (PLAQUENIL) 200 MG tablet 200 mg      losartan (COZAAR) 100 MG tablet Take 100 mg by mouth daily      pravastatin (PRAVACHOL) 20 MG tablet Take 20 mg by mouth nightly      DULoxetine (CYMBALTA) 60 MG extended release capsule TAKE 1 CAPSULE BY MOUTH EVERY DAY      vitamin D (ERGOCALCIFEROL) 1.25 MG (90629 UT) CAPS capsule       modafinil (PROVIGIL) 200 MG tablet Take 200 mg by mouth 2 times daily. pregabalin (LYRICA) 100 MG capsule TAKE 1 CAPSULE BY MOUTH THREE TIMES DAILY      gabapentin (NEURONTIN) 300 MG capsule TAKE 1 CAPSULE BY MOUTH IN THE MORNING, AT NOON, AND AT BEDTIME FOR 30 DAYS (Patient not taking: Reported on 2/17/2023) 90 capsule 1     No current facility-administered medications for this visit. Review of Systems   Constitutional:  Negative for fever. HENT:  Negative for ear pain, tinnitus and trouble swallowing. Eyes:  Negative for photophobia and visual disturbance. Respiratory:  Negative for choking and shortness of breath. Cardiovascular:  Negative for chest pain and palpitations. Gastrointestinal:  Negative for nausea and vomiting. Musculoskeletal:  Negative for back pain, gait problem, joint swelling, myalgias, neck pain and neck stiffness. Skin:  Negative for color change. Allergic/Immunologic: Negative for food allergies. Neurological:  Negative for dizziness, tremors, seizures, syncope, facial asymmetry, speech difficulty, weakness, light-headedness, numbness and headaches. Psychiatric/Behavioral:  Negative for behavioral problems, confusion, hallucinations and sleep disturbance. Objective:   /74 (Site: Right Upper Arm, Position: Sitting, Cuff Size: Medium Adult)   Pulse (!) 102   Wt 171 lb 9.6 oz (77.8 kg)   BMI 31.39 kg/m²     Physical Exam  Vitals reviewed.    Eyes: Pupils: Pupils are equal, round, and reactive to light. Cardiovascular:      Rate and Rhythm: Normal rate and regular rhythm. Heart sounds: No murmur heard. Pulmonary:      Effort: Pulmonary effort is normal.      Breath sounds: Normal breath sounds. Abdominal:      General: Bowel sounds are normal.   Musculoskeletal:         General: Normal range of motion. Cervical back: Normal range of motion. Skin:     General: Skin is warm. Neurological:      Mental Status: She is alert and oriented to person, place, and time. Cranial Nerves: No cranial nerve deficit. Sensory: No sensory deficit. Motor: No abnormal muscle tone. Coordination: Coordination normal.      Deep Tendon Reflexes: Reflexes are normal and symmetric. Babinski sign absent on the right side. Babinski sign absent on the left side. Psychiatric:         Mood and Affect: Mood normal.     Absent reflex in the lower extremity at least at the ankles with mild gait ataxia and weakness  Patient was never admitted. No results found for: WBC, RBC, HGB, HCT, MCV, MCH, MCHC, RDW, PLT, MPV  No results found for: NA, K, CL, CO2, BUN, CREATININE, GFRAA, AGRATIO, LABGLOM, GLUCOSE, PROT, LABALBU, CALCIUM, BILITOT, ALKPHOS, AST, ALT  No results found for: PROTIME, INR  No results found for: TSH, [id number], FOLATE, FERRITIN, IRON, TIBC, PTRFSAT, RETICCOUNT, TSH, FREET4  No results found for: TRIG, HDL, LDLCALC, LDLDIRECT, LABVLDL  No results found for: LABAMPH, BARBSCNU, LABBENZ, CANNAB, COCAINESCRN, LABMETH, OPIATESCREENURINE, PHENCYCLIDINESCREENURINE, PPXUR, ETOH  No results found for: LITHIUM, DILFRTOT, VALPROATE    Assessment:       Diagnosis Orders   1. Hypoxic encephalopathy (HCC)        2. Paresthesias        3. Memory loss        4. Weakness of both lower extremities        5. Cognitive dysfunction        6. Small fiber neuropathy        7.  Ataxia        Toxic ischemic encephalopathy secondary cardiorespiratory arrest. Patient has cognitive changes from the same and hypersomnolence. We have maintained her very well with Provigil she ran out of the medication 2 weeks ago and she notices a difference. She also was diagnosed with a small fiber neuropathy related to diabetes. She initially was on gabapentin which has not been changed to Lyrica. She has no side effects but has not noticed any other difference. She is going to the  and is actually doing better with her ambulation. Patient has significant difficulty concentrating and this is likely to stay I did discuss with her that she is doing very well and some of the symptoms are not likely to resolve. As far as she is able to function and we keep her on the medication we should be good for now. Nicholas Iverson MD, Angel Reyes, American Board of Psychiatry & Neurology  Board Certified in Vascular Neurology  Board Certified in Neuromuscular Medicine  Certified in [facility]:      No orders of the defined types were placed in this encounter. No orders of the defined types were placed in this encounter. No follow-ups on file.       Danilo Oneil MD

## 2024-04-09 ENCOUNTER — OFFICE VISIT (OUTPATIENT)
Dept: NEUROLOGY | Age: 59
End: 2024-04-09
Payer: MEDICARE

## 2024-04-09 VITALS
HEART RATE: 93 BPM | DIASTOLIC BLOOD PRESSURE: 62 MMHG | BODY MASS INDEX: 29.45 KG/M2 | WEIGHT: 161 LBS | SYSTOLIC BLOOD PRESSURE: 118 MMHG

## 2024-04-09 DIAGNOSIS — G47.31 PRIMARY CENTRAL SLEEP APNEA: ICD-10-CM

## 2024-04-09 DIAGNOSIS — G93.1 HYPOXIC ENCEPHALOPATHY (HCC): Primary | ICD-10-CM

## 2024-04-09 DIAGNOSIS — G47.10 HYPERSOMNOLENCE: ICD-10-CM

## 2024-04-09 DIAGNOSIS — F09 COGNITIVE DYSFUNCTION: ICD-10-CM

## 2024-04-09 DIAGNOSIS — G62.9 SMALL FIBER NEUROPATHY: ICD-10-CM

## 2024-04-09 DIAGNOSIS — Z79.899 ENCOUNTER FOR LONG-TERM (CURRENT) USE OF MEDICATIONS: ICD-10-CM

## 2024-04-09 PROCEDURE — 3078F DIAST BP <80 MM HG: CPT | Performed by: PSYCHIATRY & NEUROLOGY

## 2024-04-09 PROCEDURE — 99214 OFFICE O/P EST MOD 30 MIN: CPT | Performed by: PSYCHIATRY & NEUROLOGY

## 2024-04-09 PROCEDURE — G8419 CALC BMI OUT NRM PARAM NOF/U: HCPCS | Performed by: PSYCHIATRY & NEUROLOGY

## 2024-04-09 PROCEDURE — G8427 DOCREV CUR MEDS BY ELIG CLIN: HCPCS | Performed by: PSYCHIATRY & NEUROLOGY

## 2024-04-09 PROCEDURE — 3074F SYST BP LT 130 MM HG: CPT | Performed by: PSYCHIATRY & NEUROLOGY

## 2024-04-09 PROCEDURE — 1036F TOBACCO NON-USER: CPT | Performed by: PSYCHIATRY & NEUROLOGY

## 2024-04-09 PROCEDURE — 3017F COLORECTAL CA SCREEN DOC REV: CPT | Performed by: PSYCHIATRY & NEUROLOGY

## 2024-04-09 RX ORDER — MODAFINIL 200 MG/1
200 TABLET ORAL 2 TIMES DAILY
Qty: 60 TABLET | Refills: 0 | Status: SHIPPED | OUTPATIENT
Start: 2024-04-09 | End: 2024-05-09

## 2024-04-09 RX ORDER — TRIAMCINOLONE ACETONIDE 1 MG/G
CREAM TOPICAL
COMMUNITY
Start: 2023-05-12

## 2024-04-09 RX ORDER — METHYLPHENIDATE HYDROCHLORIDE 10 MG/1
10 TABLET ORAL PRN
Qty: 30 TABLET | Refills: 0 | Status: SHIPPED | OUTPATIENT
Start: 2024-04-09 | End: 2024-05-09

## 2024-04-09 RX ORDER — MEMANTINE HYDROCHLORIDE 5 MG/1
5 TABLET ORAL 2 TIMES DAILY
Qty: 60 TABLET | Refills: 3 | Status: SHIPPED | OUTPATIENT
Start: 2024-04-09

## 2024-04-09 NOTE — PROGRESS NOTES
Subjective:      Patient ID: Maryanne Ricci is a 58 y.o. female who presents today for:  Chief Complaint   Patient presents with    Follow-up     Pt states things are not so good. Pt wanted to know should she wear the compression socks she tried them and they made her legs swell. Last dose of ritalin and modafinil was months ago because she  did not have it        HPI 58-year-old right-handed female with a known history of hypoxic encephalopathy with memory issues and weakness in the lower extremities cognitive dysfunction's and small fiber neuropathy.  Patient is on Namenda 5 mg twice a day and we have not seen her for a year.  Patient was last seen had a hemoglobin A1c of 13 and is on Lyrica with significant nerve pain.  Last seen she was going to the [facility].  We also tried her on Provigil but she did not see much difference this was for cognition and we changed this to Ritalin 10 mg as and when required and continues on Provigil 200 mg twice a day and Lyrica 100 mg 3 times a da patient actually has not been taking her Provigil or Ritalin as she ran out and she is worse.  She is having issues with the legs    Patient has not been able to attend our office due to transport issues.  She actually did very well on the combination of Ritalin which she only takes when required but she does well with Provigil.  Patient came alone and therefore we are not quite sure how she is functioning.    No past medical history on file.  No past surgical history on file.  Social History     Socioeconomic History    Marital status:      Spouse name: Not on file    Number of children: Not on file    Years of education: Not on file    Highest education level: Not on file   Occupational History    Not on file   Tobacco Use    Smoking status: Never    Smokeless tobacco: Never   Substance and Sexual Activity    Alcohol use: Never    Drug use: Never    Sexual activity: Not on file   Other Topics Concern    Not on file   Social History

## 2024-04-15 ENCOUNTER — TELEPHONE (OUTPATIENT)
Dept: NEUROLOGY | Age: 59
End: 2024-04-15

## 2024-04-15 NOTE — TELEPHONE ENCOUNTER
LMOVM for pt to call office back.      PA was approved for her modafinil and pharmacy should be working to fill it if they haven't already.

## 2024-05-14 DIAGNOSIS — G47.10 HYPERSOMNOLENCE: ICD-10-CM

## 2024-05-15 NOTE — TELEPHONE ENCOUNTER
Patient is  requesting medication refill. Please approve or deny this request.    Rx requested:  Requested Prescriptions     Pending Prescriptions Disp Refills    memantine (NAMENDA) 5 MG tablet 60 tablet 3     Sig: Take 1 tablet by mouth 2 times daily    methylphenidate (RITALIN) 10 MG tablet 30 tablet 0     Sig: Take 1 tablet by mouth as needed (1 tablet by mouth as needed) for up to 30 days.    modafinil (PROVIGIL) 200 MG tablet 60 tablet 0     Sig: Take 1 tablet by mouth 2 times daily for 30 days. Max Daily Amount: 400 mg         Last Office Visit:   4/9/2024      Next Visit Date:  Future Appointments   Date Time Provider Department Center   10/8/2024  2:30 PM Nicholas Lucas MD [facility] NEUR Neurology -

## 2024-05-16 RX ORDER — METHYLPHENIDATE HYDROCHLORIDE 10 MG/1
10 TABLET ORAL PRN
Qty: 30 TABLET | Refills: 0 | Status: SHIPPED | OUTPATIENT
Start: 2024-05-16 | End: 2024-05-17 | Stop reason: SDUPTHER

## 2024-05-16 RX ORDER — MEMANTINE HYDROCHLORIDE 5 MG/1
5 TABLET ORAL 2 TIMES DAILY
Qty: 60 TABLET | Refills: 3 | Status: SHIPPED | OUTPATIENT
Start: 2024-05-16

## 2024-05-16 RX ORDER — MODAFINIL 200 MG/1
200 TABLET ORAL 2 TIMES DAILY
Qty: 60 TABLET | Refills: 0 | Status: SHIPPED | OUTPATIENT
Start: 2024-05-16 | End: 2024-06-27 | Stop reason: SDUPTHER

## 2024-05-17 DIAGNOSIS — G47.10 HYPERSOMNOLENCE: ICD-10-CM

## 2024-05-17 NOTE — TELEPHONE ENCOUNTER
Pharmacy stated that they needed a more specific sig for them to be able to fill.  I have fixed the sig

## 2024-05-18 RX ORDER — METHYLPHENIDATE HYDROCHLORIDE 10 MG/1
10 TABLET ORAL DAILY
Qty: 30 TABLET | Refills: 0 | Status: SHIPPED | OUTPATIENT
Start: 2024-05-18 | End: 2024-06-17

## 2024-06-27 DIAGNOSIS — G47.10 HYPERSOMNOLENCE: ICD-10-CM

## 2024-06-27 RX ORDER — METHYLPHENIDATE HYDROCHLORIDE 10 MG/1
10 TABLET ORAL DAILY
Qty: 30 TABLET | Refills: 0 | Status: SHIPPED | OUTPATIENT
Start: 2024-06-27 | End: 2024-07-27

## 2024-06-27 RX ORDER — MODAFINIL 200 MG/1
200 TABLET ORAL 2 TIMES DAILY
Qty: 60 TABLET | Refills: 0 | Status: SHIPPED | OUTPATIENT
Start: 2024-06-27 | End: 2024-07-27

## 2024-07-31 DIAGNOSIS — G47.10 HYPERSOMNOLENCE: ICD-10-CM

## 2024-07-31 NOTE — TELEPHONE ENCOUNTER
Patient is requesting medication refill. Please approve or deny this request.    Rx requested:  Requested Prescriptions     Pending Prescriptions Disp Refills    methylphenidate (RITALIN) 10 MG tablet 30 tablet 0     Sig: Take 1 tablet by mouth daily for 30 days. Max Daily Amount: 10 mg    modafinil (PROVIGIL) 200 MG tablet 60 tablet 0     Sig: Take 1 tablet by mouth 2 times daily for 30 days. Max Daily Amount: 400 mg         Last Office Visit:   4/9/2024      Next Visit Date:  Future Appointments   Date Time Provider Department Center   10/8/2024  2:30 PM Nicholas Lucas MD [place] NEURO Neurology -

## 2024-08-01 RX ORDER — MODAFINIL 200 MG/1
200 TABLET ORAL 2 TIMES DAILY
Qty: 60 TABLET | Refills: 0 | Status: SHIPPED | OUTPATIENT
Start: 2024-08-01 | End: 2024-08-31

## 2024-08-01 RX ORDER — METHYLPHENIDATE HYDROCHLORIDE 10 MG/1
10 TABLET ORAL DAILY
Qty: 30 TABLET | Refills: 0 | Status: SHIPPED | OUTPATIENT
Start: 2024-08-01 | End: 2024-08-31

## 2024-08-30 RX ORDER — MEMANTINE HYDROCHLORIDE 5 MG/1
5 TABLET ORAL 2 TIMES DAILY
Qty: 60 TABLET | Refills: 3 | Status: SHIPPED | OUTPATIENT
Start: 2024-08-30

## 2024-08-30 NOTE — TELEPHONE ENCOUNTER
Requesting medication refill. Please approve or deny this request.    Rx requested:  Requested Prescriptions     Pending Prescriptions Disp Refills    memantine (NAMENDA) 5 MG tablet [Pharmacy Med Name: MEMANTINE 5MG TABLETS] 60 tablet 3     Sig: TAKE 1 TABLET BY MOUTH TWICE DAILY         Last Office Visit:   4/9/2024      Next Visit Date:  Future Appointments   Date Time Provider Department Center   10/8/2024  2:30 PM Nicholas Lucas MD [place] NEURO Neurology -               Last refill 5/16/24. Please approve or deny.

## 2024-10-08 ENCOUNTER — OFFICE VISIT (OUTPATIENT)
Dept: NEUROLOGY | Age: 59
End: 2024-10-08
Payer: MEDICARE

## 2024-10-08 VITALS
DIASTOLIC BLOOD PRESSURE: 70 MMHG | WEIGHT: 156 LBS | SYSTOLIC BLOOD PRESSURE: 120 MMHG | HEART RATE: 93 BPM | BODY MASS INDEX: 28.53 KG/M2

## 2024-10-08 DIAGNOSIS — R20.2 PARESTHESIAS: ICD-10-CM

## 2024-10-08 DIAGNOSIS — R27.0 ATAXIA: ICD-10-CM

## 2024-10-08 DIAGNOSIS — F90.0 ATTENTION DEFICIT HYPERACTIVITY DISORDER (ADHD), PREDOMINANTLY INATTENTIVE TYPE: ICD-10-CM

## 2024-10-08 DIAGNOSIS — R41.3 MEMORY LOSS: ICD-10-CM

## 2024-10-08 DIAGNOSIS — G47.10 HYPERSOMNOLENCE: ICD-10-CM

## 2024-10-08 DIAGNOSIS — G63 POLYNEUROPATHY ASSOCIATED WITH UNDERLYING DISEASE (HCC): ICD-10-CM

## 2024-10-08 DIAGNOSIS — G93.1 HYPOXIC ENCEPHALOPATHY (HCC): Primary | ICD-10-CM

## 2024-10-08 DIAGNOSIS — G47.419 PRIMARY NARCOLEPSY WITHOUT CATAPLEXY: ICD-10-CM

## 2024-10-08 PROCEDURE — 3017F COLORECTAL CA SCREEN DOC REV: CPT | Performed by: PSYCHIATRY & NEUROLOGY

## 2024-10-08 PROCEDURE — 3074F SYST BP LT 130 MM HG: CPT | Performed by: PSYCHIATRY & NEUROLOGY

## 2024-10-08 PROCEDURE — 3078F DIAST BP <80 MM HG: CPT | Performed by: PSYCHIATRY & NEUROLOGY

## 2024-10-08 PROCEDURE — G8484 FLU IMMUNIZE NO ADMIN: HCPCS | Performed by: PSYCHIATRY & NEUROLOGY

## 2024-10-08 PROCEDURE — G8419 CALC BMI OUT NRM PARAM NOF/U: HCPCS | Performed by: PSYCHIATRY & NEUROLOGY

## 2024-10-08 PROCEDURE — 1036F TOBACCO NON-USER: CPT | Performed by: PSYCHIATRY & NEUROLOGY

## 2024-10-08 PROCEDURE — 99214 OFFICE O/P EST MOD 30 MIN: CPT | Performed by: PSYCHIATRY & NEUROLOGY

## 2024-10-08 PROCEDURE — G8427 DOCREV CUR MEDS BY ELIG CLIN: HCPCS | Performed by: PSYCHIATRY & NEUROLOGY

## 2024-10-08 RX ORDER — MODAFINIL 200 MG/1
200 TABLET ORAL 2 TIMES DAILY
Qty: 60 TABLET | Refills: 0 | Status: SHIPPED | OUTPATIENT
Start: 2024-10-08 | End: 2024-11-07

## 2024-10-08 RX ORDER — METHYLPHENIDATE HYDROCHLORIDE 10 MG/1
10 TABLET ORAL DAILY PRN
Qty: 30 TABLET | Refills: 0 | Status: SHIPPED | OUTPATIENT
Start: 2024-10-08 | End: 2024-11-07

## 2024-10-08 NOTE — PROGRESS NOTES
Subjective:      Patient ID: Maryanne Ricci is a 59 y.o. female who presents today for:  Chief Complaint   Patient presents with    6 Month Follow-Up     Pt states she is still having trouble with the neuropathy especially in her feet. Her diabetes has been ok. Pt wants to know if there anything she can do about the neuropathy in her feet.        HPI 59-year-old right-handed female history of hypoxic ischemic encephalopathy.  Patient was seen for hypersomnolence and cognitive dysfunction secondary to the same.  She has a small fiber neuropathy as well and central sleep apnea.  Patient is not Namenda 5 mg twice a day.  Patient also is on Cymbalta.  Patient is still having trouble with the neuropathy the diabetes appears to be much improved.  Patient is on tramadol 3 times a day as well.  Patient  was  on Lyrica as well.    Patient is followed by pain management and therefore we had not intervened.  She still has burning pain in the lower extremities.  We actually see her mostly for underlying hypersomnolence for hypoxic encephalopathy and we treated with Provigil with very low-dose of Ritalin.  Patient is already on Cymbalta and she takes very low-dose of alpha lipoic acid.  She walks with a walker.  She has paresthesias      No past medical history on file.  No past surgical history on file.  Social History     Socioeconomic History    Marital status:      Spouse name: Not on file    Number of children: Not on file    Years of education: Not on file    Highest education level: Not on file   Occupational History    Not on file   Tobacco Use    Smoking status: Never    Smokeless tobacco: Never   Substance and Sexual Activity    Alcohol use: Never    Drug use: Never    Sexual activity: Not on file   Other Topics Concern    Not on file   Social History Narrative    Not on file     Social Determinants of Health     Financial Resource Strain: Not on file   Food Insecurity: Not on file   Transportation Needs: Not on

## 2024-12-11 DIAGNOSIS — G47.10 HYPERSOMNOLENCE: ICD-10-CM

## 2024-12-11 DIAGNOSIS — G47.419 PRIMARY NARCOLEPSY WITHOUT CATAPLEXY: ICD-10-CM

## 2024-12-11 NOTE — TELEPHONE ENCOUNTER
Requesting medication refill. Please approve or deny this request.    Rx requested:  Requested Prescriptions     Pending Prescriptions Disp Refills    methylphenidate (RITALIN) 10 MG tablet 30 tablet 0     Sig: Take 1 tablet by mouth daily as needed (fatigue) for up to 30 days.    modafinil (PROVIGIL) 200 MG tablet 60 tablet 0     Sig: Take 1 tablet by mouth 2 times daily for 30 days. Max Daily Amount: 400 mg         Last Office Visit:   10/8/2024      Next Visit Date:  Future Appointments   Date Time Provider Department Center   4/8/2025  2:15 PM Nicholas Lucas MD [facility] -               Last refill 10/8/24. Please approve or deny.

## 2024-12-12 RX ORDER — METHYLPHENIDATE HYDROCHLORIDE 10 MG/1
10 TABLET ORAL DAILY PRN
Qty: 30 TABLET | Refills: 0 | Status: SHIPPED | OUTPATIENT
Start: 2024-12-12 | End: 2025-01-11

## 2024-12-12 RX ORDER — MODAFINIL 200 MG/1
200 TABLET ORAL 2 TIMES DAILY
Qty: 60 TABLET | Refills: 0 | Status: SHIPPED | OUTPATIENT
Start: 2024-12-12 | End: 2025-01-11

## 2025-03-12 DIAGNOSIS — G47.419 PRIMARY NARCOLEPSY WITHOUT CATAPLEXY: ICD-10-CM

## 2025-03-12 DIAGNOSIS — R20.2 PARESTHESIAS: ICD-10-CM

## 2025-03-12 NOTE — TELEPHONE ENCOUNTER
Patient is  requesting medication refill. Please approve or deny this request.    Rx requested:  Requested Prescriptions     Pending Prescriptions Disp Refills    modafinil (PROVIGIL) 200 MG tablet 60 tablet 0     Sig: Take 1 tablet by mouth 2 times daily for 30 days.    methylphenidate (RITALIN) 10 MG tablet 30 tablet 0     Sig: Take 1 tablet by mouth daily as needed (take 10 mg by mouth dailt as needed) for up to 30 days.         Last Office Visit:   10/8/2024      Next Visit Date:  Future Appointments   Date Time Provider Department Center   4/8/2025  2:15 PM Nicholas Lucas MD [place] -

## 2025-03-13 RX ORDER — METHYLPHENIDATE HYDROCHLORIDE 10 MG/1
10 TABLET ORAL DAILY PRN
Qty: 30 TABLET | Refills: 0 | Status: SHIPPED | OUTPATIENT
Start: 2025-03-13 | End: 2025-04-12

## 2025-03-13 RX ORDER — MODAFINIL 200 MG/1
200 TABLET ORAL 2 TIMES DAILY
Qty: 60 TABLET | Refills: 0 | Status: SHIPPED | OUTPATIENT
Start: 2025-03-13 | End: 2025-04-12

## 2025-04-08 ENCOUNTER — OFFICE VISIT (OUTPATIENT)
Age: 60
End: 2025-04-08
Payer: MEDICARE

## 2025-04-08 VITALS
SYSTOLIC BLOOD PRESSURE: 136 MMHG | WEIGHT: 157 LBS | DIASTOLIC BLOOD PRESSURE: 82 MMHG | OXYGEN SATURATION: 98 % | HEART RATE: 110 BPM | BODY MASS INDEX: 28.72 KG/M2

## 2025-04-08 DIAGNOSIS — G47.10 HYPERSOMNOLENCE: ICD-10-CM

## 2025-04-08 DIAGNOSIS — R20.2 PARESTHESIAS: ICD-10-CM

## 2025-04-08 DIAGNOSIS — G47.31 PRIMARY CENTRAL SLEEP APNEA: ICD-10-CM

## 2025-04-08 DIAGNOSIS — G93.1 HYPOXIC ENCEPHALOPATHY (HCC): Primary | ICD-10-CM

## 2025-04-08 DIAGNOSIS — F90.0 ATTENTION DEFICIT HYPERACTIVITY DISORDER (ADHD), PREDOMINANTLY INATTENTIVE TYPE: ICD-10-CM

## 2025-04-08 DIAGNOSIS — G62.9 SMALL FIBER NEUROPATHY: ICD-10-CM

## 2025-04-08 DIAGNOSIS — G47.419 PRIMARY NARCOLEPSY WITHOUT CATAPLEXY: ICD-10-CM

## 2025-04-08 PROCEDURE — 1036F TOBACCO NON-USER: CPT | Performed by: PSYCHIATRY & NEUROLOGY

## 2025-04-08 PROCEDURE — 3075F SYST BP GE 130 - 139MM HG: CPT | Performed by: PSYCHIATRY & NEUROLOGY

## 2025-04-08 PROCEDURE — G8419 CALC BMI OUT NRM PARAM NOF/U: HCPCS | Performed by: PSYCHIATRY & NEUROLOGY

## 2025-04-08 PROCEDURE — 3017F COLORECTAL CA SCREEN DOC REV: CPT | Performed by: PSYCHIATRY & NEUROLOGY

## 2025-04-08 PROCEDURE — 3079F DIAST BP 80-89 MM HG: CPT | Performed by: PSYCHIATRY & NEUROLOGY

## 2025-04-08 PROCEDURE — 99214 OFFICE O/P EST MOD 30 MIN: CPT | Performed by: PSYCHIATRY & NEUROLOGY

## 2025-04-08 PROCEDURE — G8427 DOCREV CUR MEDS BY ELIG CLIN: HCPCS | Performed by: PSYCHIATRY & NEUROLOGY

## 2025-04-08 PROCEDURE — 99213 OFFICE O/P EST LOW 20 MIN: CPT | Performed by: PSYCHIATRY & NEUROLOGY

## 2025-04-08 RX ORDER — MEMANTINE HYDROCHLORIDE 5 MG/1
5 TABLET ORAL 2 TIMES DAILY
Qty: 60 TABLET | Refills: 3 | Status: SHIPPED | OUTPATIENT
Start: 2025-04-08

## 2025-04-08 RX ORDER — MODAFINIL 200 MG/1
200 TABLET ORAL 2 TIMES DAILY
Qty: 60 TABLET | Refills: 0 | Status: SHIPPED | OUTPATIENT
Start: 2025-04-08 | End: 2025-05-08

## 2025-04-08 RX ORDER — METHYLPHENIDATE HYDROCHLORIDE 10 MG/1
10 TABLET ORAL DAILY PRN
Qty: 30 TABLET | Refills: 0 | Status: SHIPPED | OUTPATIENT
Start: 2025-04-08 | End: 2025-05-08

## 2025-04-08 RX ORDER — GLIPIZIDE 5 MG/1
5 TABLET, FILM COATED, EXTENDED RELEASE ORAL DAILY
COMMUNITY
Start: 2025-03-10

## 2025-04-08 NOTE — PROGRESS NOTES
Subjective:      Patient ID: Maryanne Ricci is a 59 y.o. female who presents today for:  Chief Complaint   Patient presents with    6 Month Follow-Up     Pt states her neuropathy is the same. When it's cold out the neuropathy is worse. No questions or concerns at this time.        HPI 59-year-old right-handed female with history of hypoxic ischemic encephalopathy with hypersomnolence.  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  Patient also seen here for polyneuropathy.  We have her on Lyrica 100 mg 1 in the morning 1 in the afternoon and 200 mg at night.  Patient was recommended to use alpha lipoic acid and she is already on Cymbalta.  Patient symptoms have also discussed  No past medical history on file.  No past surgical history on file.  Social History     Socioeconomic History    Marital status:      Spouse name: Not on file    Number of children: Not on file    Years of education: Not on file    Highest education level: Not on file   Occupational History    Not on file   Tobacco Use    Smoking status: Never    Smokeless tobacco: Never   Substance and Sexual Activity    Alcohol use: Never    Drug use: Never    Sexual activity: Not on file   Other Topics Concern    Not on file   Social History Narrative    Not on file     Social Drivers of Health     Financial Resource Strain: Not on file   Food Insecurity: Not on file   Transportation Needs: Not on file   Physical Activity: Not on file   Stress: Not on file   Social Connections: Not on file   Intimate Partner Violence: Not on file   Housing Stability: Not on file     No family history on file.  Allergies   Allergen Reactions    Interferon Jerome Anaphylaxis    Sulfamethoxazole-Trimethoprim Swelling    Tizanidine Hcl Other (See Comments)

## 2025-05-07 DIAGNOSIS — G47.419 PRIMARY NARCOLEPSY WITHOUT CATAPLEXY: ICD-10-CM

## 2025-05-07 NOTE — TELEPHONE ENCOUNTER
Requesting medication refill. Please approve or deny this request.    Rx requested:  Requested Prescriptions     Pending Prescriptions Disp Refills    modafinil (PROVIGIL) 200 MG tablet 60 tablet 0     Sig: Take 1 tablet by mouth 2 times daily for 30 days.         Last Office Visit:   4/8/2025      Next Visit Date:  Future Appointments   Date Time Provider Department Center   10/31/2025  9:45 AM Nicholas Lucas MD [facility] -               Last refill 4/8/25. Please approve or deny.

## 2025-05-08 RX ORDER — MODAFINIL 200 MG/1
200 TABLET ORAL 2 TIMES DAILY
Qty: 60 TABLET | Refills: 5 | Status: SHIPPED | OUTPATIENT
Start: 2025-05-08 | End: 2025-11-04

## 2025-08-06 DIAGNOSIS — R20.2 PARESTHESIAS: ICD-10-CM

## 2025-08-07 RX ORDER — METHYLPHENIDATE HYDROCHLORIDE 10 MG/1
10 TABLET ORAL DAILY PRN
Qty: 30 TABLET | Refills: 0 | Status: SHIPPED | OUTPATIENT
Start: 2025-08-07 | End: 2025-09-06